# Patient Record
(demographics unavailable — no encounter records)

---

## 2025-05-28 NOTE — END OF VISIT
[FreeTextEntry3] :  I, Soraya Lauren, acted as a scribe on behalf of Dr. Blaine Aguirre M.D  on 11/04/2024.   All medical entries made by the scribe were at my, Dr. Blaine Aguirre M.D ,  direction and personally dictated by me on 11/04/2024. I have reviewed the chart and agree that the record accurately reflects my personal performance of the history, physical exam, assessment and plan. I have also personally directed, reviewed, and agreed with the chart.  [Time Spent: ___ minutes] : I have spent [unfilled] minutes of time on the encounter which excludes teaching and separately reported services.

## 2025-05-28 NOTE — PLAN
[FreeTextEntry1] : 32 year  old female infertility, hx TOA  Rx pelvic MRI to assess for any structural abnml Discussed SHG/HSG pending MRI results, pt would need ppx abx x5 d to prevent future TOA Discussed possible laparoscopy,  hysteroscopy and chomrotubation    During this visit 30 minutes were spent face-to-face with greater than 50% of the time dedicated to counseling.

## 2025-05-28 NOTE — HISTORY OF PRESENT ILLNESS
[FreeTextEntry1] : 32 year  old female  G0 patient, previously seen by GYN Dr. Emanuel, referred by [facility] Dr. Sylvia Ramirez. LMP 5/11/25.  Patient reports her menstrual cycles have been every 25-27 days with some dysmenorrhea and no heavy bleeding. Cycles used to be 3-4 days , now after removal of Paragard IUD periods lasting one day with regular flow then stops, then resumes. Pt reports more painful period, Advil does not relief pain. Denies breakthrough bleeding. Pt had IUD for 14yrs.  Patient has been trying to conceive since December.  She went to Dr. Ramirez for evaluation and possible egg freezing. She was evaluated by Dr. Ramirez and referred to the office for evaluation.  She has a complex gynecological history which started after having a ParaGard IUD in place for 14 years.  Reports she was eventually found with a chronic TOA.  IUD was removed, found with Actinomyces.  After that she was found with a TOA that was treated at [facility], received antibiotics and p.o. treatment.  At the time when IUD was removed, she was also found with JEANETTE 1, has had colposcopies.  Her last cotesting was negative in November 2025. Reviewed results from her phone from her portal.  She was sent for possible diagnostic laparoscopy and chromotubation.  She has not had any pelvic imaging since Oct 2024- results below.   Reports her AMH has been fluctuating between 0.9 and 1 pending egg freezing.   Pt states Dr. Ramirez advised against HSG, due to risk of infection. Pt inquires about SHG risk of infx.  Pt is concerned over possible Asherman's syndrome  Pelvic US 10 /2024 Results: Pt reports uterus prior to this US used to be anteverted  Retroverted uterus measuring [id number]  Endometrial [id number] 1. 8mm L ovarian debris filled cyst  2. 23 mm r ovarian debris dilled cyst    History of abnormal Pap smear with colposcopy in the past. GYNH: History of TOA, possible pelvic adhesions.  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  History of anemia.  PSH:  Hysteroscopic IUD removal, D&C Blood Transfusions:  Blood transfusions at the age of 1.  Allergies:  Sulfa, amoxicillin. Family History:  Family history of aortic stenosis in father.  Breast cancer in mother and paternal grandmother.  Maternal grandmother with Crohn's, Liver disease (mother).   Meds:  Valacyclovir for cold sores. Social:  Pt is a , Her  is an Ortho resident in [place]  [Mammogramdate] : 5/30/25

## 2025-05-28 NOTE — CONSULT LETTER
[Dear  ___] : Dear  [unfilled], [Consult Letter:] : I had the pleasure of evaluating your patient, [unfilled]. [Please see my note below.] : Please see my note below. [Consult Closing:] : Thank you very much for allowing me to participate in the care of this patient.  If you have any questions, please do not hesitate to contact me. [Sincerely,] : Sincerely, [FreeTextEntry2] : Keisha Ramirez MD [address] [place] [address], [place] [address]  [FreeTextEntry3] : Blaine Aguirre MD